# Patient Record
Sex: FEMALE | Race: BLACK OR AFRICAN AMERICAN | NOT HISPANIC OR LATINO | URBAN - METROPOLITAN AREA
[De-identification: names, ages, dates, MRNs, and addresses within clinical notes are randomized per-mention and may not be internally consistent; named-entity substitution may affect disease eponyms.]

---

## 2022-03-21 ENCOUNTER — OFFICE VISIT (OUTPATIENT)
Dept: URBAN - METROPOLITAN AREA CLINIC 70 | Facility: CLINIC | Age: 52
End: 2022-03-21

## 2022-03-23 ENCOUNTER — OFFICE VISIT (OUTPATIENT)
Dept: URBAN - METROPOLITAN AREA CLINIC 70 | Facility: CLINIC | Age: 52
End: 2022-03-23

## 2022-05-10 ENCOUNTER — OFFICE VISIT (OUTPATIENT)
Dept: URGENT CARE | Facility: URGENT CARE | Age: 52
End: 2022-05-10
Payer: COMMERCIAL

## 2022-05-10 DIAGNOSIS — B35.3 TINEA PEDIS OF BOTH FEET: Primary | ICD-10-CM

## 2022-05-10 PROCEDURE — 99213 OFFICE O/P EST LOW 20 MIN: CPT | Performed by: PHYSICIAN ASSISTANT

## 2022-05-10 RX ORDER — GARLIC 1000 MG
CAPSULE ORAL
COMMUNITY

## 2022-05-10 RX ORDER — CYANOCOBALAMIN (VITAMIN B-12) 500 MCG
TABLET ORAL EVERY 24 HOURS
COMMUNITY

## 2022-05-10 RX ORDER — ASCORBIC ACID 500 MG
TABLET ORAL
COMMUNITY

## 2022-05-10 RX ORDER — TERBINAFINE HYDROCHLORIDE 250 MG/1
250 TABLET ORAL DAILY
Qty: 14 TABLET | Refills: 0 | Status: SHIPPED | OUTPATIENT
Start: 2022-05-10 | End: 2022-05-24

## 2022-05-10 RX ORDER — BACLOFEN 20 MG
TABLET ORAL EVERY 24 HOURS
COMMUNITY

## 2022-05-11 NOTE — PATIENT INSTRUCTIONS
1.  Athlete's foot  - Recommend soaking feet in Listerine twice a day  - Wash daily with soap and water and keep feet dry as possible  - Begin Lamisil as prescribed.  This been sent to the pharmacy for you  - Continue to monitor and return as needed  Patient Education     Athlete's Foot    Athlete's foot (tinea pedis) is a fungal infection of the skin on your feet. It often occurs on the skin that is between or underneath your toes. It can also occur on the soles of your feet. Symptoms include itchy or white and flaky areas on the skin. The infection can spread from person to person (is contagious). It can also spread when a person's bare feet come in contact with the fungus on shower floors or on items such as shoes.  Follow these instructions at home:  Medicines  · Apply or take over-the-counter and prescription medicines only as told by your doctor.  · Apply your antifungal medicine as told by your doctor. Do not stop using the medicine even if your feet start to get better.  Foot care  · Do not scratch your feet.  · Keep your feet dry:  ? Wear cotton or wool socks. Change your socks every day or if they become wet.  ? Wear shoes that allow air to move around, such as sandals or canvas tennis shoes.  · Wash and dry your feet:  ? Every day or as told by your doctor.  ? After exercising.  ? Including the area between your toes.  General instructions  · Do not share any of these items that touch your feet:  ? Towels.  ? Shoes.  ? Nail clippers.  ? Other personal items.  · Protect your feet by wearing sandals in wet areas, such as locker rooms and shared showers.  · Keep all follow-up visits as told by your doctor. This is important.  · If you have diabetes, keep your blood sugar under control.  Contact a doctor if:  · You have a fever.  · You have swelling, pain, warmth, or redness in your foot.  · Your feet are not getting better with treatment.  · Your symptoms get worse.  · You have new  symptoms.  Summary  · Athlete's foot is a fungal infection of the skin on your feet.  · Symptoms include itchy or white and flaky areas on the skin.  · Apply your antifungal medicine as told by your doctor.  · Keep your feet clean and dry.  This information is not intended to replace advice given to you by your health care provider. Make sure you discuss any questions you have with your health care provider.  Document Revised: 08/05/2021 Document Reviewed: 08/05/2021  [employer] Patient Education © 2022 [employer]

## 2022-05-11 NOTE — PROGRESS NOTES
Urgent Care Visit Note    Subjective   Chief Complaint  No chief complaint on file.      History of Present Illness  Zandra Bowling is a 51 y.o. female presenting for pain to the bottom of her feet.  She notes that she has some cracking at the base of her toes.  She notes that she thinks this is why her feet are swelling.  She did also come here on a flight from [place] and notes that her heels feel swollen.  She denies any calf pain.  Denies history of blood clot    Review of Systems  Pertinent positives and negatives as per the HPI    No past medical history on file.      Current Outpatient Medications:   •  terbinafine (LamiSIL) 250 mg tablet, Take 1 tablet (250 mg total) by mouth daily for 14 days, Disp: 14 tablet, Rfl: 0    Objective   Vital Signs  There were no vitals taken for this visit.    Physical Exam  Skin:     Comments: Bottom of the feet bilaterally at the base of the toes are with cracked fissures and some mild swelling.  There is minimal swelling to the heels bilaterally.  No calf pain.         Lab Review  No results found for this or any previous visit (from the past 12 hour(s)).    Radiology Review  No results found.    Medical Decision Making  Signs and symptoms consistent with athlete's foot.  Recommend soaking in Listerine and Lamisil sent to the pharmacy    Assessment/Plan   1.  Athlete's foot  - Recommend soaking feet in Listerine twice a day  - Wash daily with soap and water and keep feet dry as possible  - Begin Lamisil as prescribed.  This been sent to the pharmacy for you  - Continue to monitor and return as needed    Patient Education: Ready to learn, no apparent learning barriers were identified; learning preference includes listening.  Explained diagnosis and treatment plan; patient/caregiver expressed understanding of the content. All questions answered.     Patient Instructions   1.  Athlete's foot  - Recommend soaking feet in Listerine twice a day  - Wash daily with soap and water  and keep feet dry as possible  - Begin Lamisil as prescribed.  This been sent to the pharmacy for you  - Continue to monitor and return as needed  Patient Education     Athlete's Foot    Athlete's foot (tinea pedis) is a fungal infection of the skin on your feet. It often occurs on the skin that is between or underneath your toes. It can also occur on the soles of your feet. Symptoms include itchy or white and flaky areas on the skin. The infection can spread from person to person (is contagious). It can also spread when a person's bare feet come in contact with the fungus on shower floors or on items such as shoes.  Follow these instructions at home:  Medicines  · Apply or take over-the-counter and prescription medicines only as told by your doctor.  · Apply your antifungal medicine as told by your doctor. Do not stop using the medicine even if your feet start to get better.  Foot care  · Do not scratch your feet.  · Keep your feet dry:  ? Wear cotton or wool socks. Change your socks every day or if they become wet.  ? Wear shoes that allow air to move around, such as sandals or canvas tennis shoes.  · Wash and dry your feet:  ? Every day or as told by your doctor.  ? After exercising.  ? Including the area between your toes.  General instructions  · Do not share any of these items that touch your feet:  ? Towels.  ? Shoes.  ? Nail clippers.  ? Other personal items.  · Protect your feet by wearing sandals in wet areas, such as locker rooms and shared showers.  · Keep all follow-up visits as told by your doctor. This is important.  · If you have diabetes, keep your blood sugar under control.  Contact a doctor if:  · You have a fever.  · You have swelling, pain, warmth, or redness in your foot.  · Your feet are not getting better with treatment.  · Your symptoms get worse.  · You have new symptoms.  Summary  · Athlete's foot is a fungal infection of the skin on your feet.  · Symptoms include itchy or white and flaky  areas on the skin.  · Apply your antifungal medicine as told by your doctor.  · Keep your feet clean and dry.  This information is not intended to replace advice given to you by your health care provider. Make sure you discuss any questions you have with your health care provider.  Document Revised: 08/05/2021 Document Reviewed: 08/05/2021  [employer] Patient Education © 2022 [employer]           JONE Genao  5/10/2022

## 2022-07-18 ENCOUNTER — OFFICE VISIT (OUTPATIENT)
Dept: URBAN - METROPOLITAN AREA CLINIC 70 | Facility: CLINIC | Age: 52
End: 2022-07-18
Payer: COMMERCIAL

## 2022-07-18 ENCOUNTER — WEB ENCOUNTER (OUTPATIENT)
Dept: URBAN - METROPOLITAN AREA CLINIC 70 | Facility: CLINIC | Age: 52
End: 2022-07-18

## 2022-07-18 ENCOUNTER — LAB OUTSIDE AN ENCOUNTER (OUTPATIENT)
Dept: URBAN - METROPOLITAN AREA CLINIC 70 | Facility: CLINIC | Age: 52
End: 2022-07-18

## 2022-07-18 VITALS
DIASTOLIC BLOOD PRESSURE: 92 MMHG | BODY MASS INDEX: 32.9 KG/M2 | SYSTOLIC BLOOD PRESSURE: 158 MMHG | HEIGHT: 64 IN | HEART RATE: 80 BPM | TEMPERATURE: 98.2 F | WEIGHT: 192.7 LBS

## 2022-07-18 DIAGNOSIS — K59.04 CHRONIC IDIOPATHIC CONSTIPATION: ICD-10-CM

## 2022-07-18 DIAGNOSIS — Z12.11 COLON CANCER SCREENING: ICD-10-CM

## 2022-07-18 PROBLEM — 82934008: Status: ACTIVE | Noted: 2022-07-18

## 2022-07-18 PROCEDURE — 99204 OFFICE O/P NEW MOD 45 MIN: CPT

## 2022-07-18 RX ORDER — LACTULOSE 10 G/15ML
30ML SOLUTION ORAL ONCE A DAY
Qty: 900 ML | Refills: 3 | OUTPATIENT
Start: 2022-07-18 | End: 2022-11-14

## 2022-07-18 RX ORDER — ERGOCALCIFEROL CAPSULES, 1.25 MG/1
CAPSULE ORAL
Qty: 4 CAPSULE | Status: ACTIVE | COMMUNITY

## 2022-07-18 NOTE — HPI-TODAY'S VISIT:
The patient presents for colon cancer screening. No prior colonoscopy.  There is no family history of colon cancer.  She admits to constipation with bowel movements every 3-4 days without feelings of complete evacuation and associated abdominal pain.  She has tried stool softeners and miralax with no improvement in symptoms.  They deny any diarrhea, rectal bleeding, or weight loss.

## 2022-08-08 ENCOUNTER — OFFICE VISIT (OUTPATIENT)
Dept: URBAN - METROPOLITAN AREA SURGERY CENTER 24 | Facility: SURGERY CENTER | Age: 52
End: 2022-08-08

## 2022-08-22 ENCOUNTER — DASHBOARD ENCOUNTERS (OUTPATIENT)
Age: 52
End: 2022-08-22

## 2022-08-22 ENCOUNTER — OFFICE VISIT (OUTPATIENT)
Dept: URBAN - METROPOLITAN AREA CLINIC 70 | Facility: CLINIC | Age: 52
End: 2022-08-22

## 2022-08-22 RX ORDER — LACTULOSE 10 G/15ML
30ML SOLUTION ORAL ONCE A DAY
Qty: 900 ML | Refills: 3 | Status: ACTIVE | COMMUNITY
Start: 2022-07-18 | End: 2022-11-14

## 2022-08-22 RX ORDER — ERGOCALCIFEROL CAPSULES, 1.25 MG/1
CAPSULE ORAL
Qty: 4 CAPSULE | Status: ACTIVE | COMMUNITY